# Patient Record
Sex: FEMALE | Race: WHITE | Employment: FULL TIME | URBAN - METROPOLITAN AREA
[De-identification: names, ages, dates, MRNs, and addresses within clinical notes are randomized per-mention and may not be internally consistent; named-entity substitution may affect disease eponyms.]

---

## 2024-11-11 ENCOUNTER — TELEMEDICINE (OUTPATIENT)
Dept: RHEUMATOLOGY | Facility: CLINIC | Age: 24
End: 2024-11-11
Payer: COMMERCIAL

## 2024-11-11 ENCOUNTER — TELEPHONE (OUTPATIENT)
Dept: RHEUMATOLOGY | Facility: CLINIC | Age: 24
End: 2024-11-11

## 2024-11-11 DIAGNOSIS — R76.8 POSITIVE ANA (ANTINUCLEAR ANTIBODY): Primary | ICD-10-CM

## 2024-11-11 DIAGNOSIS — Z11.59 NEED FOR HEPATITIS C SCREENING TEST: ICD-10-CM

## 2024-11-11 DIAGNOSIS — E55.9 VITAMIN D DEFICIENCY: ICD-10-CM

## 2024-11-11 DIAGNOSIS — M76.62 LEFT ACHILLES TENDINITIS: ICD-10-CM

## 2024-11-11 PROBLEM — M25.50 DIFFUSE ARTHRALGIA: Status: ACTIVE | Noted: 2024-11-11

## 2024-11-11 PROBLEM — M25.50 DIFFUSE ARTHRALGIA: Status: RESOLVED | Noted: 2024-11-11 | Resolved: 2024-11-11

## 2024-11-11 PROCEDURE — 99204 OFFICE O/P NEW MOD 45 MIN: CPT | Performed by: INTERNAL MEDICINE

## 2024-11-11 NOTE — ASSESSMENT & PLAN NOTE
Orders:    Anti-DNA antibody, double-stranded; Future    Anti-scleroderma antibody; Future    Centromere Antibody; Future    Histone Antibody; Future    Nuclear antigen antibody; Future    Sjogren's Antibodies; Future    Sedimentation rate, automated; Future    C-reactive protein; Future    C3 complement; Future    C4 complement; Future    Beta-2 glycoprotein antibodies; Future    Cardiolipin antibody; Future    Lupus anticoagulant; Future    Urinalysis with microscopic; Future    Protein / creatinine ratio, urine; Future    RF Screen w/ Reflex to Titer; Future    Cyclic citrul peptide antibody, IgG; Future    CBC and differential; Future    Comprehensive metabolic panel; Future    Ferritin; Future

## 2024-11-11 NOTE — ASSESSMENT & PLAN NOTE
Ms. Salas is a 24-year-old female with history significant for left Achilles tendinosis and a positive ANYA 1:320 speckled pattern who presents for an evaluation of both conditions.  She is referred by Dr. Ignacio for a rheumatology consult.    - Nely presents today for an evaluation of a positive ANYA that was detected in 2021 due to left wrist pain that resolved spontaneously.  The ANYA was evaluated by rheumatology at [facility] and determined to be clinically insignificant.  She presents for a second opinion and re-evaluation as she has been experiencing constant left heel/Achilles insertion pain for the past 2 years which has been diagnosed as Achilles insertional tendinosis.  She has failed multiple conservative options offered by Podiatry.  Her articular and extra-articular review of systems is otherwise unrevealing.  Based on isolated tendon involvement this makes it challenging to associate with an inflammatory condition (including a seronegative spondyloarthropathy) and my overall suspicion for a rheumatic etiology is low.  She may need to follow up with Podiatry to discuss the next steps in management and has been offered PRP, shockwave therapy or surgery.    - To re-evaluate the ANYA I will complete subset serologies, but again based on her review of systems my suspicion for a clinically significant ANYA is low at this time.     Orders:    Anti-DNA antibody, double-stranded; Future    Anti-scleroderma antibody; Future    Centromere Antibody; Future    Histone Antibody; Future    Nuclear antigen antibody; Future    Sjogren's Antibodies; Future    Sedimentation rate, automated; Future    C-reactive protein; Future    C3 complement; Future    C4 complement; Future    Beta-2 glycoprotein antibodies; Future    Cardiolipin antibody; Future    Lupus anticoagulant; Future    Urinalysis with microscopic; Future    Protein / creatinine ratio, urine; Future    RF Screen w/ Reflex to Titer; Future    Cyclic citrul  peptide antibody, IgG; Future    CBC and differential; Future    Comprehensive metabolic panel; Future    Ferritin; Future

## 2024-11-11 NOTE — PROGRESS NOTES
Virtual Regular Visit  Name: Nely Salas      : 2000      MRN: [id number]  Encounter Provider: Lori Calhoun MD  Encounter Date: 2024   Encounter department: [facility] [place]    Verification of patient location:    Patient is located at Other in the following state in which I hold an active license [place]    Assessment & Plan  Positive ANYA (antinuclear antibody)  Ms. Salas is a 24-year-old female with history significant for left Achilles tendinosis and a positive ANYA 1:320 speckled pattern who presents for an evaluation of both conditions.  She is referred by Dr. Ignacio for a rheumatology consult.    - Nely presents today for an evaluation of a positive ANYA that was detected in  due to left wrist pain that resolved spontaneously.  The ANYA was evaluated by rheumatology at [facility] and determined to be clinically insignificant.  She presents for a second opinion and re-evaluation as she has been experiencing constant left heel/Achilles insertion pain for the past 2 years which has been diagnosed as Achilles insertional tendinosis.  She has failed multiple conservative options offered by Podiatry.  Her articular and extra-articular review of systems is otherwise unrevealing.  Based on isolated tendon involvement this makes it challenging to associate with an inflammatory condition (including a seronegative spondyloarthropathy) and my overall suspicion for a rheumatic etiology is low.  She may need to follow up with Podiatry to discuss the next steps in management and has been offered PRP, shockwave therapy or surgery.    - To re-evaluate the ANYA I will complete subset serologies, but again based on her review of systems my suspicion for a clinically significant ANYA is low at this time.     Orders:    Anti-DNA antibody, double-stranded; Future    Anti-scleroderma antibody; Future    Centromere Antibody; Future    Histone Antibody; Future    Nuclear antigen antibody;  Future    Sjogren's Antibodies; Future    Sedimentation rate, automated; Future    C-reactive protein; Future    C3 complement; Future    C4 complement; Future    Beta-2 glycoprotein antibodies; Future    Cardiolipin antibody; Future    Lupus anticoagulant; Future    Urinalysis with microscopic; Future    Protein / creatinine ratio, urine; Future    RF Screen w/ Reflex to Titer; Future    Cyclic citrul peptide antibody, IgG; Future    CBC and differential; Future    Comprehensive metabolic panel; Future    Ferritin; Future    Left Achilles tendinitis    Orders:    Anti-DNA antibody, double-stranded; Future    Anti-scleroderma antibody; Future    Centromere Antibody; Future    Histone Antibody; Future    Nuclear antigen antibody; Future    Sjogren's Antibodies; Future    Sedimentation rate, automated; Future    C-reactive protein; Future    C3 complement; Future    C4 complement; Future    Beta-2 glycoprotein antibodies; Future    Cardiolipin antibody; Future    Lupus anticoagulant; Future    Urinalysis with microscopic; Future    Protein / creatinine ratio, urine; Future    RF Screen w/ Reflex to Titer; Future    Cyclic citrul peptide antibody, IgG; Future    CBC and differential; Future    Comprehensive metabolic panel; Future    Ferritin; Future    Vitamin D deficiency         Need for hepatitis C screening test    Orders:    Chronic Hepatitis Panel; Future          Encounter provider Lori Calhoun MD    The patient was identified by name and date of birth. Nely Salas was informed that this is a telemedicine visit and that the visit is being conducted through the Epic Embedded platform. She agrees to proceed..  My office door was closed. No one else was in the room.  She acknowledged consent and understanding of privacy and security of the video platform. The patient has agreed to participate and understands they can discontinue the visit at any time.    Patient is aware this is a billable service.      History of Present Illness     Ms. Salas is a 24-year-old female with history significant for left Achilles tendinosis and a positive ANYA 1:320 speckled pattern who presents for an evaluation of both conditions.  She is referred by Dr. Ignacio for a rheumatology consult.    Patient in 2021 she was experiencing left wrist pain for which she had arthritis testing done and it showed the positive ANYA with negative subset serologies, rheumatoid factor, uric acid, Lyme antibody profile and HLA B27 antigen.  She was referred to Rheumatology and seen by Dr. Moisés Berry at [facility] in [phone].  She underwent an MRI of the left wrist which was unremarkable except for showing mild focal synovitis in the pisotriquetral recess.  She mentions her symptoms eventually resolved spontaneously and there was no specific diagnosis or treatment offered by her rheumatologist.  For the past 2 years she has been experiencing pain in her left heel at the insertion of the Achilles tendon which is present on a daily basis but can vary in intensity.  When she is active and moving around the symptoms are improved, any time she rests for a prolonged period of time and then gets up the pain becomes very prominent.  She has seen 2 podiatrists for an evaluation and had an MRI of the left ankle done which showed Achilles insertional tendinosis and was otherwise normal.  She has received cortisone injections and tried multiple NSAIDs (possibly including meloxicam) without any relief.  She does have ibuprofen 800 mg tablets which she takes infrequently and this helps to some degree.  Physical therapy provided her with some improvement in the range of motion but did not impact the pain.  Wearing a boot for 2 months and immobilizing the joint did provide her with relief.  As the next steps she was offered PRP and shockwave therapy but she has not pursued this yet as it is not covered by insurance.  She was also offered tendon lengthening  surgery but is still contemplating this.    She reports on occasion she may experience right shoulder pain but otherwise denies joint pains, swelling or morning stiffness.  With prolonged computer use she may notice mild dry eyes.  In the shower she notices hair loss.  No fevers, unintentional weight loss, alopecia, consistent dry eyes, dry mouth, inflammatory eye disease, mouth/nose ulcers, recurrent swollen glands (she mentions last month she noticed painful lumps in the region of her armpits which resolved spontaneously within 2 weeks), pleuritic chest pain, inflammatory bowel disease, blood clots, miscarriages, Raynaud's or a family history of autoimmune disease.  Her maternal grand mother has osteoporosis.         Review of Systems  Constitutional: Negative for weight change, fevers, chills, night sweats, fatigue.  ENT/Mouth: Negative for hearing changes, ear pain, nasal congestion, sinus pain, hoarseness, sore throat, rhinorrhea, swallowing difficulty.   Eyes: Negative for pain, redness, discharge, vision changes.   Cardiovascular: Negative for chest pain, SOB, palpitations.   Respiratory: Negative for cough, sputum, wheezing, dyspnea.   Gastrointestinal: Negative for nausea, vomiting, diarrhea, constipation, pain, heartburn.  Genitourinary: Negative for dysuria, urinary frequency, hematuria.   Musculoskeletal: As per HPI.  Skin: Negative for skin rash, color changes.   Neuro: Negative for weakness, numbness, tingling, loss of consciousness.   Psych: Negative for anxiety, depression.   Heme/Lymph: Negative for easy bruising, bleeding, lymphadenopathy.      Past Medical History   No past medical history on file.  No past surgical history on file.  No family history on file.  No current outpatient medications on file prior to visit.     No current facility-administered medications on file prior to visit.   Not on File   No current outpatient medications on file prior to visit.     No current  facility-administered medications on file prior to visit.      Social History     Tobacco Use    Smoking status: Not on file    Smokeless tobacco: Not on file   Substance and Sexual Activity    Alcohol use: Not on file    Drug use: Not on file    Sexual activity: Not on file         Objective     There were no vitals taken for this visit.  Physical Exam  General: Well appearing, well nourished, in no distress. Oriented x 3, normal mood and affect.  Skin: Good turgor, no rash, unusual bruising or prominent lesions.  Hair: Normal texture and distribution.  HEENT:  Head: Normocephalic, atraumatic.  Eyes: Conjunctiva clear, sclera non-icteric, EOM intact.  Nose: No external lesions.  Neck: Supple.  Neurologic: Alert and oriented. No focal neurological deficits appreciated.   Psychiatric: Normal mood and affect.     Visit Time  Total Visit Duration: 25 minutes.

## 2024-11-11 NOTE — TELEPHONE ENCOUNTER
Please offer her a VV follow up on Dec 2nd, can check what time works for her. If she accepts please ask Nubia to open a slot.

## 2024-11-13 ENCOUNTER — LAB (OUTPATIENT)
Dept: LAB | Facility: CLINIC | Age: 24
End: 2024-11-13
Payer: COMMERCIAL

## 2024-11-13 ENCOUNTER — TELEPHONE (OUTPATIENT)
Dept: RHEUMATOLOGY | Facility: CLINIC | Age: 24
End: 2024-11-13

## 2024-11-13 DIAGNOSIS — M76.62 LEFT ACHILLES TENDINITIS: ICD-10-CM

## 2024-11-13 DIAGNOSIS — Z11.59 NEED FOR HEPATITIS C SCREENING TEST: ICD-10-CM

## 2024-11-13 DIAGNOSIS — R76.8 POSITIVE ANA (ANTINUCLEAR ANTIBODY): ICD-10-CM

## 2024-11-13 LAB
ALBUMIN SERPL BCG-MCNC: 4.3 G/DL (ref 3.5–5)
ALP SERPL-CCNC: 73 U/L (ref 34–104)
ALT SERPL W P-5'-P-CCNC: 19 U/L (ref 7–52)
ANION GAP SERPL CALCULATED.3IONS-SCNC: 8 MMOL/L (ref 4–13)
AST SERPL W P-5'-P-CCNC: 22 U/L (ref 13–39)
BACTERIA UR QL AUTO: ABNORMAL /HPF
BASOPHILS # BLD AUTO: 0.05 THOUSANDS/ÂΜL (ref 0–0.1)
BASOPHILS NFR BLD AUTO: 1 % (ref 0–1)
BILIRUB SERPL-MCNC: 0.58 MG/DL (ref 0.2–1)
BILIRUB UR QL STRIP: NEGATIVE
BUN SERPL-MCNC: 13 MG/DL (ref 5–25)
C3 SERPL-MCNC: 186 MG/DL (ref 87–200)
C4 SERPL-MCNC: 43 MG/DL (ref 19–52)
CALCIUM SERPL-MCNC: 9.1 MG/DL (ref 8.4–10.2)
CHLORIDE SERPL-SCNC: 104 MMOL/L (ref 96–108)
CLARITY UR: ABNORMAL
CO2 SERPL-SCNC: 26 MMOL/L (ref 21–32)
COLOR UR: YELLOW
CREAT SERPL-MCNC: 0.62 MG/DL (ref 0.6–1.3)
CREAT UR-MCNC: 151.3 MG/DL
CRP SERPL QL: 11.4 MG/L
EOSINOPHIL # BLD AUTO: 0.14 THOUSAND/ÂΜL (ref 0–0.61)
EOSINOPHIL NFR BLD AUTO: 2 % (ref 0–6)
ERYTHROCYTE [DISTWIDTH] IN BLOOD BY AUTOMATED COUNT: 13.2 % (ref 11.6–15.1)
ERYTHROCYTE [SEDIMENTATION RATE] IN BLOOD: 23 MM/HOUR (ref 0–19)
FERRITIN SERPL-MCNC: 49 NG/ML (ref 11–307)
GFR SERPL CREATININE-BSD FRML MDRD: 124 ML/MIN/1.73SQ M
GLUCOSE P FAST SERPL-MCNC: 87 MG/DL (ref 65–99)
GLUCOSE UR STRIP-MCNC: NEGATIVE MG/DL
HCT VFR BLD AUTO: 43.6 % (ref 34.8–46.1)
HGB BLD-MCNC: 14.3 G/DL (ref 11.5–15.4)
HGB UR QL STRIP.AUTO: NEGATIVE
IMM GRANULOCYTES # BLD AUTO: 0.03 THOUSAND/UL (ref 0–0.2)
IMM GRANULOCYTES NFR BLD AUTO: 0 % (ref 0–2)
KETONES UR STRIP-MCNC: NEGATIVE MG/DL
LEUKOCYTE ESTERASE UR QL STRIP: NEGATIVE
LYMPHOCYTES # BLD AUTO: 1.34 THOUSANDS/ÂΜL (ref 0.6–4.47)
LYMPHOCYTES NFR BLD AUTO: 18 % (ref 14–44)
MCH RBC QN AUTO: 28.1 PG (ref 26.8–34.3)
MCHC RBC AUTO-ENTMCNC: 32.8 G/DL (ref 31.4–37.4)
MCV RBC AUTO: 86 FL (ref 82–98)
MONOCYTES # BLD AUTO: 0.49 THOUSAND/ÂΜL (ref 0.17–1.22)
MONOCYTES NFR BLD AUTO: 7 % (ref 4–12)
MUCOUS THREADS UR QL AUTO: ABNORMAL
NEUTROPHILS # BLD AUTO: 5.31 THOUSANDS/ÂΜL (ref 1.85–7.62)
NEUTS SEG NFR BLD AUTO: 72 % (ref 43–75)
NITRITE UR QL STRIP: NEGATIVE
NON-SQ EPI CELLS URNS QL MICRO: ABNORMAL /HPF
NRBC BLD AUTO-RTO: 0 /100 WBCS
PH UR STRIP.AUTO: 6.5 [PH]
PLATELET # BLD AUTO: 321 THOUSANDS/UL (ref 149–390)
PMV BLD AUTO: 9.7 FL (ref 8.9–12.7)
POTASSIUM SERPL-SCNC: 4.1 MMOL/L (ref 3.5–5.3)
PROT SERPL-MCNC: 7.2 G/DL (ref 6.4–8.4)
PROT UR STRIP-MCNC: ABNORMAL MG/DL
PROT UR-MCNC: 9.8 MG/DL
PROT/CREAT UR: 0.1 MG/G{CREAT} (ref 0–0.1)
RBC # BLD AUTO: 5.09 MILLION/UL (ref 3.81–5.12)
RBC #/AREA URNS AUTO: ABNORMAL /HPF
SODIUM SERPL-SCNC: 138 MMOL/L (ref 135–147)
SP GR UR STRIP.AUTO: >=1.03 (ref 1–1.03)
UROBILINOGEN UR STRIP-ACNC: <2 MG/DL
WBC # BLD AUTO: 7.36 THOUSAND/UL (ref 4.31–10.16)
WBC #/AREA URNS AUTO: ABNORMAL /HPF

## 2024-11-13 PROCEDURE — 84156 ASSAY OF PROTEIN URINE: CPT

## 2024-11-13 PROCEDURE — 86705 HEP B CORE ANTIBODY IGM: CPT

## 2024-11-13 PROCEDURE — 85652 RBC SED RATE AUTOMATED: CPT

## 2024-11-13 PROCEDURE — 86200 CCP ANTIBODY: CPT

## 2024-11-13 PROCEDURE — 82570 ASSAY OF URINE CREATININE: CPT

## 2024-11-13 PROCEDURE — 85613 RUSSELL VIPER VENOM DILUTED: CPT

## 2024-11-13 PROCEDURE — 82728 ASSAY OF FERRITIN: CPT

## 2024-11-13 PROCEDURE — 85670 THROMBIN TIME PLASMA: CPT

## 2024-11-13 PROCEDURE — 80053 COMPREHEN METABOLIC PANEL: CPT

## 2024-11-13 PROCEDURE — 86225 DNA ANTIBODY NATIVE: CPT

## 2024-11-13 PROCEDURE — 86146 BETA-2 GLYCOPROTEIN ANTIBODY: CPT

## 2024-11-13 PROCEDURE — 36415 COLL VENOUS BLD VENIPUNCTURE: CPT

## 2024-11-13 PROCEDURE — 86147 CARDIOLIPIN ANTIBODY EA IG: CPT

## 2024-11-13 PROCEDURE — 86235 NUCLEAR ANTIGEN ANTIBODY: CPT

## 2024-11-13 PROCEDURE — 85732 THROMBOPLASTIN TIME PARTIAL: CPT

## 2024-11-13 PROCEDURE — 86430 RHEUMATOID FACTOR TEST QUAL: CPT

## 2024-11-13 PROCEDURE — 86704 HEP B CORE ANTIBODY TOTAL: CPT

## 2024-11-13 PROCEDURE — 85705 THROMBOPLASTIN INHIBITION: CPT

## 2024-11-13 PROCEDURE — 81001 URINALYSIS AUTO W/SCOPE: CPT

## 2024-11-13 PROCEDURE — 86160 COMPLEMENT ANTIGEN: CPT

## 2024-11-13 PROCEDURE — 85025 COMPLETE CBC W/AUTO DIFF WBC: CPT

## 2024-11-13 PROCEDURE — 86803 HEPATITIS C AB TEST: CPT

## 2024-11-13 PROCEDURE — 87340 HEPATITIS B SURFACE AG IA: CPT

## 2024-11-13 PROCEDURE — 86140 C-REACTIVE PROTEIN: CPT

## 2024-11-13 NOTE — TELEPHONE ENCOUNTER
The patient was called, she stated that she has to work on Dec. 2nd so an appointment that day will not work for her.

## 2024-11-14 LAB
HBV CORE AB SER QL: NORMAL
HBV CORE IGM SER QL: NORMAL
HBV SURFACE AG SER QL: NORMAL
HCV AB SER QL: NORMAL
RHEUMATOID FACT SER QL LA: NEGATIVE

## 2024-11-14 NOTE — TELEPHONE ENCOUNTER
She could do a virtual either December 4th after 11 Am and the 5th before noon. What day and time would you like her scheduled for?

## 2024-11-15 ENCOUNTER — TELEPHONE (OUTPATIENT)
Dept: RHEUMATOLOGY | Facility: CLINIC | Age: 24
End: 2024-11-15

## 2024-11-15 LAB
APTT SCREEN TO CONFIRM RATIO: 1.02 RATIO (ref 0–1.34)
B2 GLYCOPROT1 IGA SERPL IA-ACNC: 1.4
B2 GLYCOPROT1 IGG SERPL IA-ACNC: 1.1
B2 GLYCOPROT1 IGM SERPL IA-ACNC: 3
CARDIOLIPIN IGA SER IA-ACNC: 3.2
CARDIOLIPIN IGG SER IA-ACNC: 2.7
CARDIOLIPIN IGM SER IA-ACNC: 1.8
CCP AB SER IA-ACNC: 0.6
CENTROMERE B AB SER-ACNC: <0.2 AI (ref 0–0.9)
CONFIRM APTT/NORMAL: 35.1 SEC (ref 0–47.6)
DSDNA AB SER-ACNC: <1 IU/ML (ref 0–9)
ENA RNP AB SER-ACNC: 0.3 AI (ref 0–0.9)
ENA SCL70 AB SER-ACNC: <0.2 AI (ref 0–0.9)
ENA SM AB SER-ACNC: <0.2 AI (ref 0–0.9)
ENA SS-A AB SER-ACNC: <0.2 AI (ref 0–0.9)
ENA SS-B AB SER-ACNC: <0.2 AI (ref 0–0.9)
LA PPP-IMP: NORMAL
SCREEN APTT: 36.9 SEC (ref 0–43.5)
SCREEN DRVVT: 35.2 SEC (ref 0–47)
THROMBIN TIME: 16.8 SEC (ref 0–23)

## 2024-11-15 NOTE — TELEPHONE ENCOUNTER
The patient was called , she would be able to do the 17 th, but before 11 AM. She will be on her way to work at 11:30.

## 2024-11-19 ENCOUNTER — RESULTS FOLLOW-UP (OUTPATIENT)
Dept: RHEUMATOLOGY | Facility: CLINIC | Age: 24
End: 2024-11-19

## 2024-11-19 LAB — HISTONE IGG SER IA-ACNC: 0.5 UNITS (ref 0–0.9)

## 2024-12-16 PROBLEM — M76.62 LEFT ACHILLES TENDINITIS: Status: ACTIVE | Noted: 2024-12-16

## 2024-12-17 ENCOUNTER — TELEMEDICINE (OUTPATIENT)
Dept: RHEUMATOLOGY | Facility: CLINIC | Age: 24
End: 2024-12-17
Payer: COMMERCIAL

## 2024-12-17 DIAGNOSIS — M76.62 LEFT ACHILLES TENDINITIS: ICD-10-CM

## 2024-12-17 DIAGNOSIS — R76.8 POSITIVE ANA (ANTINUCLEAR ANTIBODY): Primary | ICD-10-CM

## 2024-12-17 PROCEDURE — 99213 OFFICE O/P EST LOW 20 MIN: CPT | Performed by: INTERNAL MEDICINE

## 2024-12-17 NOTE — PROGRESS NOTES
Virtual Regular Visit  Name: Nely Salas      : 2000      MRN: [id number]  Encounter Provider: Lori Calhoun MD  Encounter Date: 2024   Encounter department: [facility] [place]    Verification of patient location:    Patient is located at Other in the following state in which I hold an active license [place]    Assessment & Plan  Positive ANYA (antinuclear antibody)  Ms. Salas is a 24-year-old female with history significant for left Achilles tendinosis and a positive ANYA 1:320 speckled pattern who presents for a follow-up.     - Nely presents today for a follow-up of a positive ANYA that was detected in  due to left wrist pain that resolved spontaneously.  The ANYA was evaluated by rheumatology at [facility] and determined to be clinically insignificant.  She presents for a second opinion and re-evaluation as she has been experiencing constant left heel/Achilles insertion pain for the past 2 years which has been diagnosed as Achilles insertional tendinosis.  She has failed multiple conservative options offered by Podiatry.  Her articular and extra-articular review of systems is otherwise unrevealing.  Based on isolated tendon involvement this makes it challenging to associate with an inflammatory condition (including a seronegative spondyloarthropathy) and my overall suspicion for a rheumatic etiology is low.  She may need to follow up with Podiatry to discuss the next steps in management and has been offered PRP, shockwave therapy or surgery.     - At this time I do not suspect the ANYA to be clinically significant.  It will be reevaluated as needed.    Orders:    CBC and differential; Future    Comprehensive metabolic panel; Future    C-reactive protein; Future    Sedimentation rate, automated; Future    C4 complement; Future    C3 complement; Future    Urinalysis with microscopic; Future    Protein / creatinine ratio, urine; Future    Anti-DNA antibody,  double-stranded; Future    Left Achilles tendinitis               Encounter provider Lori Calhoun MD    The patient was identified by name and date of birth. Nely Salas was informed that this is a telemedicine visit and that the visit is being conducted through the Epic Embedded platform. She agrees to proceed..  My office door was closed. No one else was in the room.  She acknowledged consent and understanding of privacy and security of the video platform. The patient has agreed to participate and understands they can discontinue the visit at any time.    Patient is aware this is a billable service.     History of Present Illness       INITIAL VISIT NOTE (11/2024):  Ms. Salas is a 24-year-old female with history significant for left Achilles tendinosis and a positive ANYA 1:320 speckled pattern who presents for an evaluation of both conditions.  She is referred by Dr. Ignacio for a rheumatology consult.    Patient in 2021 she was experiencing left wrist pain for which she had arthritis testing done and it showed the positive ANYA with negative subset serologies, rheumatoid factor, uric acid, Lyme antibody profile and HLA B27 antigen.  She was referred to Rheumatology and seen by Dr. Moisés Berry at [facility] in [phone].  She underwent an MRI of the left wrist which was unremarkable except for showing mild focal synovitis in the pisotriquetral recess.  She mentions her symptoms eventually resolved spontaneously and there was no specific diagnosis or treatment offered by her rheumatologist.  For the past 2 years she has been experiencing pain in her left heel at the insertion of the Achilles tendon which is present on a daily basis but can vary in intensity.  When she is active and moving around the symptoms are improved, any time she rests for a prolonged period of time and then gets up the pain becomes very prominent.  She has seen 2 podiatrists for an evaluation and had an MRI of the left ankle done  which showed Achilles insertional tendinosis and was otherwise normal.  She has received cortisone injections and tried multiple NSAIDs (possibly including meloxicam) without any relief.  She does have ibuprofen 800 mg tablets which she takes infrequently and this helps to some degree.  Physical therapy provided her with some improvement in the range of motion but did not impact the pain.  Wearing a boot for 2 months and immobilizing the joint did provide her with relief.  As the next steps she was offered PRP and shockwave therapy but she has not pursued this yet as it is not covered by insurance.  She was also offered tendon lengthening surgery but is still contemplating this.    She reports on occasion she may experience right shoulder pain but otherwise denies joint pains, swelling or morning stiffness.  With prolonged computer use she may notice mild dry eyes.  In the shower she notices hair loss.  No fevers, unintentional weight loss, alopecia, consistent dry eyes, dry mouth, inflammatory eye disease, mouth/nose ulcers, recurrent swollen glands (she mentions last month she noticed painful lumps in the region of her armpits which resolved spontaneously within 2 weeks), pleuritic chest pain, inflammatory bowel disease, blood clots, miscarriages, Raynaud's or a family history of autoimmune disease.  Her maternal grand mother has osteoporosis.       12/17/2024:  Patient presents for a follow-up appointment to review her results.  The ESR and CRP were minimally elevated at 23 and 11.4, respectively.  An ANYA specificity, C3, C4, antiphospholipid antibody panel, urine analysis, urine protein creatinine ratio, rheumatoid factor, anti-CCP antibody, CBC, CMP, chronic hepatitis panel and ferritin were unremarkable.    No new complaints from the last visit.  She still has to follow-up with podiatry to discuss the next steps in treatment for the Achilles tendinosis.      Review of Systems  Constitutional: Negative for  weight change, fevers, chills, night sweats, fatigue.  ENT/Mouth: Negative for hearing changes, ear pain, nasal congestion, sinus pain, hoarseness, sore throat, rhinorrhea, swallowing difficulty.   Eyes: Negative for pain, redness, discharge, vision changes.   Cardiovascular: Negative for chest pain, SOB, palpitations.   Respiratory: Negative for cough, sputum, wheezing, dyspnea.   Gastrointestinal: Negative for nausea, vomiting, diarrhea, constipation, pain, heartburn.  Genitourinary: Negative for dysuria, urinary frequency, hematuria.   Musculoskeletal: As per HPI.  Skin: Negative for skin rash, color changes.   Neuro: Negative for weakness, numbness, tingling, loss of consciousness.   Psych: Negative for anxiety, depression.   Heme/Lymph: Negative for easy bruising, bleeding, lymphadenopathy.      Past Medical History   No past medical history on file.  No past surgical history on file.  No family history on file.  No current outpatient medications on file prior to visit.     No current facility-administered medications on file prior to visit.   Not on File   No current outpatient medications on file prior to visit.     No current facility-administered medications on file prior to visit.      Social History     Tobacco Use    Smoking status: Not on file    Smokeless tobacco: Not on file   Substance and Sexual Activity    Alcohol use: Not on file    Drug use: Not on file    Sexual activity: Not on file         Objective     There were no vitals taken for this visit.  Physical Exam  General: Well appearing, well nourished, in no distress. Oriented x 3, normal mood and affect.  Skin: Good turgor, no rash, unusual bruising or prominent lesions.  Hair: Normal texture and distribution.  HEENT:  Head: Normocephalic, atraumatic.  Eyes: Conjunctiva clear, sclera non-icteric, EOM intact.  Nose: No external lesions.  Neck: Supple.  Neurologic: Alert and oriented. No focal neurological deficits appreciated.   Psychiatric:  Normal mood and affect.       Visit Time  Total Visit Duration: 12 minutes.

## 2024-12-17 NOTE — ASSESSMENT & PLAN NOTE
Ms. Salas is a 24-year-old female with history significant for left Achilles tendinosis and a positive ANYA 1:320 speckled pattern who presents for a follow-up.     - Nely presents today for a follow-up of a positive ANYA that was detected in 2021 due to left wrist pain that resolved spontaneously.  The ANYA was evaluated by rheumatology at [facility] and determined to be clinically insignificant.  She presents for a second opinion and re-evaluation as she has been experiencing constant left heel/Achilles insertion pain for the past 2 years which has been diagnosed as Achilles insertional tendinosis.  She has failed multiple conservative options offered by Podiatry.  Her articular and extra-articular review of systems is otherwise unrevealing.  Based on isolated tendon involvement this makes it challenging to associate with an inflammatory condition (including a seronegative spondyloarthropathy) and my overall suspicion for a rheumatic etiology is low.  She may need to follow up with Podiatry to discuss the next steps in management and has been offered PRP, shockwave therapy or surgery.     - At this time I do not suspect the ANYA to be clinically significant.  It will be reevaluated as needed.    Orders:    CBC and differential; Future    Comprehensive metabolic panel; Future    C-reactive protein; Future    Sedimentation rate, automated; Future    C4 complement; Future    C3 complement; Future    Urinalysis with microscopic; Future    Protein / creatinine ratio, urine; Future    Anti-DNA antibody, double-stranded; Future

## 2025-05-08 ENCOUNTER — OFFICE VISIT (OUTPATIENT)
Age: 25
End: 2025-05-08

## 2025-05-08 VITALS — HEIGHT: 63 IN | WEIGHT: 160 LBS | BODY MASS INDEX: 28.35 KG/M2

## 2025-05-08 DIAGNOSIS — M72.2 PLANTAR FASCIITIS, LEFT: Primary | ICD-10-CM

## 2025-05-08 RX ORDER — DEXAMETHASONE SODIUM PHOSPHATE 4 MG/ML
4 INJECTION, SOLUTION INTRA-ARTICULAR; INTRALESIONAL; INTRAMUSCULAR; INTRAVENOUS; SOFT TISSUE ONCE
Status: COMPLETED | OUTPATIENT
Start: 2025-05-08 | End: 2025-05-08

## 2025-05-08 RX ORDER — TRIAMCINOLONE ACETONIDE 40 MG/ML
40 INJECTION, SUSPENSION INTRA-ARTICULAR; INTRAMUSCULAR ONCE
Status: COMPLETED | OUTPATIENT
Start: 2025-05-08 | End: 2025-05-08

## 2025-05-08 RX ORDER — LIDOCAINE HYDROCHLORIDE 10 MG/ML
1 INJECTION, SOLUTION EPIDURAL; INFILTRATION; INTRACAUDAL; PERINEURAL ONCE
Status: COMPLETED | OUTPATIENT
Start: 2025-05-08 | End: 2025-05-08

## 2025-05-08 RX ORDER — MELOXICAM 15 MG/1
15 TABLET ORAL DAILY
Qty: 30 TABLET | Refills: 0 | Status: SHIPPED | OUTPATIENT
Start: 2025-05-08

## 2025-05-08 RX ADMIN — DEXAMETHASONE SODIUM PHOSPHATE 4 MG: 4 INJECTION, SOLUTION INTRA-ARTICULAR; INTRALESIONAL; INTRAMUSCULAR; INTRAVENOUS; SOFT TISSUE at 20:58

## 2025-05-08 RX ADMIN — TRIAMCINOLONE ACETONIDE 40 MG: 40 INJECTION, SUSPENSION INTRA-ARTICULAR; INTRAMUSCULAR at 21:00

## 2025-05-08 RX ADMIN — LIDOCAINE HYDROCHLORIDE 1 ML: 10 INJECTION, SOLUTION EPIDURAL; INFILTRATION; INTRACAUDAL; PERINEURAL at 20:59

## 2025-05-08 NOTE — PROGRESS NOTES
[facility] Podiatric Medicine and Surgery Office Visit    ASSESSMENT     Diagnoses and all orders for this visit:    Plantar fasciitis, left  -     meloxicam (Mobic) 15 mg tablet; Take 1 tablet (15 mg total) by mouth daily  -     Foot/lower extremity injection  -     lidocaine (PF) (XYLOCAINE-MPF) 1 % injection 1 mL  -     dexamethasone (DECADRON) injection 4 mg  -     triamcinolone acetonide (KENALOG-40) 40 mg/mL injection 40 mg    Other orders  -     etonogestrel (NEXPLANON) subdermal implant; Inject under the skin         Problem List Items Addressed This Visit    None  Visit Diagnoses         Plantar fasciitis, left    -  Primary    Relevant Medications    meloxicam (Mobic) 15 mg tablet    lidocaine (PF) (XYLOCAINE-MPF) 1 % injection 1 mL (Completed)    dexamethasone (DECADRON) injection 4 mg (Completed)    triamcinolone acetonide (KENALOG-40) 40 mg/mL injection 40 mg (Completed)    Other Relevant Orders    Foot/lower extremity injection          PLAN  -Patient was educated regarding their condition.  -Educated patient on plantar fasciitis stretching program to be performed daily  -Recommend purchase of supportive shoes with wide toe box. Recommend purchase of OTC orthosis (superfeet, powersteps, or tread labs).  -Rx meloxicam  -Corticosteroid injection performed today  -Patient will RTC in 6-weeks    Foot/lower extremity injection    Performed by: Ernesto Dacosta DPM  Authorized by: Ernesto Dacosta DPM    Procedure:     Other Assisting Provider: No      Verbal consent obtained?: Yes      Risks and benefits: Risks, benefits and alternatives were discussed      Consent given by:  Patient    Time out: Immediately prior to the procedure a time out was called      Patient states understanding of procedure being performed: Yes      Site marked: Yes      Patient identity confirmed:  Verbally with patient    Supporting Documentation:     Indications:  Pain    Procedure Details:                Ethyl Chloride was applied       Needle size: 25 G G    Ultrasound Guidance: no      Approach:  Plantar    Laterality:  Left    Cyst Aspiration/Injection: No      Location: aponeurosis      Aponeurosis Structures: Plantar fascia origin      Injection Information:       Patient tolerance:  Patient tolerated the procedure well with no immediate complications    Comments:      Patient's left foot was exposed.  The area of greatest pain was palpated and marked to the left foot.  The foot was cleansed with an alcohol swab and Betadine prep stick.  Next utilizing a 3 cc syringe the foot was injected with 1 mL 1% lidocaine plain, 1 mL dexamethasone 4 mg/mL, 1 mL Kenalog 40.  After the needle was removed the foot was dried and a Band-Aid was placed over the injection site.  The patient tolerated the procedure with no immediate complications.        SUBJECTIVE    Chief Complaint:  Left heel pain'     Patient ID: Nely Mckay is a pleasant 25 year old female who presents today with left heel pain. She states that it has been bothering her for quite some time. The last week it has been extremely painful. Denies any injury. It hurts her the most after she is on her feet for long periods at a time, sits down, and then gets back up. It also becomes painful in the mornings after getting out of bed.  She states that she has seen multiple podiatrist for this issue and has had multiple injections to the area.  She also states that she has underwent multiple courses of physical therapy with no long-lasting relief.      The following portions of the patient's history were reviewed and updated as appropriate: allergies, current medications, past family history, past medical history, past social history, past surgical history and problem list.    Review of Systems   Constitutional: Negative.    HENT: Negative.     Respiratory: Negative.     Cardiovascular: Negative.    Gastrointestinal: Negative.    Musculoskeletal:  Positive for myalgias.   Skin:  "Negative.    Neurological: Negative.          OBJECTIVE      Ht 5' 3\" (1.6 m)   Wt 72.6 kg (160 lb)   BMI 28.34 kg/m²        Physical Exam  Constitutional:       Appearance: Normal appearance.   HENT:      Head: Normocephalic and atraumatic.   Eyes:      General:         Right eye: No discharge.         Left eye: No discharge.   Cardiovascular:      Rate and Rhythm: Normal rate and regular rhythm.      Pulses:           Dorsalis pedis pulses are 2+ on the right side and 2+ on the left side.        Posterior tibial pulses are 2+ on the right side and 2+ on the left side.   Pulmonary:      Effort: Pulmonary effort is normal.      Breath sounds: Normal breath sounds.   Skin:     General: Skin is warm.      Capillary Refill: Capillary refill takes less than 2 seconds.   Neurological:      Sensory: Sensation is intact. No sensory deficit.           MSK:  -Pain on palpation of medial calcaneal tubercle at the insertion site of the plantar fascia of the left foot  -no pain with compression of both sides of heel  -No gross deformities noted  -Active range of motion lesser digits intact  -MMT 5/5 to all muscle compartments of the lower extremity  -Ankle dorsiflexion is less than 10 degrees with knee extended, and knee flexed    Derm:  -No lesions, abrasions, or open wounds noted  -No noted interdigital maceration, peeling, malodor  -No areas of callus formation noted on exam  -no erythema, ecchymosis, edema noted     Vascular:  -DP and PT pulses intact b/l  -Capillary refill time <2 sec b/l  -Skin temp: WNL    Neuro:  -Light sensation intact bilaterally  -Protective sensation intact bilaterally  -Tinel sign negative to the tibial nerve the left foot        "

## 2025-05-08 NOTE — PATIENT INSTRUCTIONS
Purchase a supportive pair of sneakers such as Tian, Hoka, Saucony, New Balance, On Cloud, Altra.  Some qualities to look for is that the shoe should bend only where the toes bend, the sole should not be too flimsy, it should have a wide toe box and a somewhat stiff heel support. Purchase a supportive over-the-counter pair of inserts such as Superfeet, powersteps, tread labs.    [employer]  [address] [facility] [address]  [phone]     [employer]  [address], [place] [address]  [phone]     [employer]  [address] [place] [address] [phone]     [employer]   [address]  [phone]     [employer]  [address], [place], [place] [id number] [phone]     [employer]  [address] [place], [place] [id number] [phone]     [facility] [facility]   [address] [place] [address]  [phone]     [employer]  [address] [place], [place]  [phone]     [employer] [address], [address]  [phone]     [employer]  [address] [place] [address], [address], [place] [address] [phone]

## 2025-06-18 ENCOUNTER — OFFICE VISIT (OUTPATIENT)
Age: 25
End: 2025-06-18
Payer: COMMERCIAL

## 2025-06-18 VITALS — HEIGHT: 63 IN | BODY MASS INDEX: 28.35 KG/M2 | WEIGHT: 160 LBS

## 2025-06-18 DIAGNOSIS — M72.2 PLANTAR FASCIITIS, LEFT: Primary | ICD-10-CM

## 2025-06-18 PROCEDURE — 99213 OFFICE O/P EST LOW 20 MIN: CPT | Performed by: STUDENT IN AN ORGANIZED HEALTH CARE EDUCATION/TRAINING PROGRAM

## 2025-06-18 PROCEDURE — 20550 NJX 1 TENDON SHEATH/LIGAMENT: CPT | Performed by: STUDENT IN AN ORGANIZED HEALTH CARE EDUCATION/TRAINING PROGRAM

## 2025-06-18 NOTE — PROGRESS NOTES
[facility] Podiatric Medicine and Surgery Office Visit    ASSESSMENT     Diagnoses and all orders for this visit:    Plantar fasciitis, left  -     Ambulatory referral to Physical Therapy; Future  -     Foot/lower extremity injection  -     lidocaine (PF) (XYLOCAINE-MPF) 1 % injection 1 mL  -     dexamethasone (DECADRON) injection 4 mg  -     triamcinolone acetonide (KENALOG-40) 40 mg/mL injection 40 mg           Problem List Items Addressed This Visit    None  Visit Diagnoses         Plantar fasciitis, left    -  Primary    Relevant Medications    lidocaine (PF) (XYLOCAINE-MPF) 1 % injection 1 mL (Completed)    dexamethasone (DECADRON) injection 4 mg (Completed)    triamcinolone acetonide (KENALOG-40) 40 mg/mL injection 40 mg (Completed)    Other Relevant Orders    Ambulatory referral to Physical Therapy    Foot/lower extremity injection            PLAN  -Patient was educated regarding their condition.  -Educated patient on plantar fasciitis stretching program to be performed daily  -Recommend purchase of supportive shoes with wide toe box. Recommend purchase of OTC orthosis (superfeet, powersteps, or tread labs).  -Rx meloxicam  -Corticosteroid injection performed today  -Rx physical therapy  -Patient will RTC in 6-weeks    Foot/lower extremity injection    Performed by: Ernesto Dacosta DPM  Authorized by: Ernesto Dacosta DPM    Procedure:     Other Assisting Provider: No      Verbal consent obtained?: Yes      Risks and benefits: Risks, benefits and alternatives were discussed      Consent given by:  Patient    Time out: Immediately prior to the procedure a time out was called      Patient states understanding of procedure being performed: Yes      Site marked: Yes      Patient identity confirmed:  Verbally with patient    Supporting Documentation:     Indications:  Pain    Procedure Details:                Ethyl Chloride was applied      Needle size: 25 G G    Ultrasound Guidance: no      Approach:  Plantar     Laterality:  Left    Cyst Aspiration/Injection: No      Location: aponeurosis      Aponeurosis Structures: Plantar fascia origin      Injection Information:       Patient tolerance:  Patient tolerated the procedure well with no immediate complications    Comments:      Patient's left foot was exposed.  The area of greatest pain was palpated and marked to the left foot.  The foot was cleansed with an alcohol swab and Betadine prep stick.  Next utilizing a 3 cc syringe the foot was injected with 1 mL 1% lidocaine plain, 1 mL dexamethasone 4 mg/mL, 1 mL Kenalog 40.  After the needle was removed the foot was dried and a Band-Aid was placed over the injection site.  The patient tolerated the procedure with no immediate complications.        SUBJECTIVE    Chief Complaint:  Left heel pain'     Patient ID: Nely Mckay is a pleasant 25 year old female who presents today with left heel pain. She states that it has been bothering her for quite some time. The last week it has been extremely painful. Denies any injury. It hurts her the most after she is on her feet for long periods at a time, sits down, and then gets back up. It also becomes painful in the mornings after getting out of bed.  She states that she has seen multiple podiatrist for this issue and has had multiple injections to the area.  She also states that she has underwent multiple courses of physical therapy with no long-lasting relief.    6/18/2025: Nely is overall doing well today. She states that her left heel has been feeling slightly better. She has had about 60% relief from the cortisone injection she received her last visit. She states that she is still having some pain when on her feet for long periods at a time.       The following portions of the patient's history were reviewed and updated as appropriate: allergies, current medications, past family history, past medical history, past social history, past surgical history and problem  "list.    Review of Systems   Constitutional: Negative.    HENT: Negative.     Respiratory: Negative.     Cardiovascular: Negative.    Gastrointestinal: Negative.    Musculoskeletal:  Positive for myalgias.   Skin: Negative.    Neurological: Negative.          OBJECTIVE      Ht 5' 3\" (1.6 m)   Wt 72.6 kg (160 lb)   BMI 28.34 kg/m²        Physical Exam  Constitutional:       Appearance: Normal appearance.   HENT:      Head: Normocephalic and atraumatic.     Eyes:      General:         Right eye: No discharge.         Left eye: No discharge.       Cardiovascular:      Rate and Rhythm: Normal rate and regular rhythm.      Pulses:           Dorsalis pedis pulses are 2+ on the right side and 2+ on the left side.        Posterior tibial pulses are 2+ on the right side and 2+ on the left side.   Pulmonary:      Effort: Pulmonary effort is normal.      Breath sounds: Normal breath sounds.     Skin:     General: Skin is warm.      Capillary Refill: Capillary refill takes less than 2 seconds.     Neurological:      Sensory: Sensation is intact. No sensory deficit.           MSK:  -Pain on palpation of medial calcaneal tubercle at the insertion site of the plantar fascia of the left foot  -no pain with compression of both sides of heel  -No gross deformities noted  -Active range of motion lesser digits intact  -MMT 5/5 to all muscle compartments of the lower extremity  -Ankle dorsiflexion is less than 10 degrees with knee extended, and knee flexed    Derm:  -No lesions, abrasions, or open wounds noted  -No noted interdigital maceration, peeling, malodor  -No areas of callus formation noted on exam  -no erythema, ecchymosis, edema noted     Vascular:  -DP and PT pulses intact b/l  -Capillary refill time <2 sec b/l  -Skin temp: WNL    Neuro:  -Light sensation intact bilaterally  -Protective sensation intact bilaterally  -Tinel sign negative to the tibial nerve the left foot        "

## 2025-06-19 RX ORDER — LIDOCAINE HYDROCHLORIDE 10 MG/ML
1 INJECTION, SOLUTION EPIDURAL; INFILTRATION; INTRACAUDAL; PERINEURAL ONCE
Status: COMPLETED | OUTPATIENT
Start: 2025-06-19 | End: 2025-06-19

## 2025-06-19 RX ORDER — DEXAMETHASONE SODIUM PHOSPHATE 4 MG/ML
4 INJECTION, SOLUTION INTRA-ARTICULAR; INTRALESIONAL; INTRAMUSCULAR; INTRAVENOUS; SOFT TISSUE ONCE
Status: COMPLETED | OUTPATIENT
Start: 2025-06-19 | End: 2025-06-19

## 2025-06-19 RX ORDER — TRIAMCINOLONE ACETONIDE 40 MG/ML
40 INJECTION, SUSPENSION INTRA-ARTICULAR; INTRAMUSCULAR ONCE
Status: COMPLETED | OUTPATIENT
Start: 2025-06-19 | End: 2025-06-19

## 2025-06-19 RX ADMIN — DEXAMETHASONE SODIUM PHOSPHATE 4 MG: 4 INJECTION, SOLUTION INTRA-ARTICULAR; INTRALESIONAL; INTRAMUSCULAR; INTRAVENOUS; SOFT TISSUE at 15:12

## 2025-06-19 RX ADMIN — TRIAMCINOLONE ACETONIDE 40 MG: 40 INJECTION, SUSPENSION INTRA-ARTICULAR; INTRAMUSCULAR at 15:14

## 2025-06-19 RX ADMIN — LIDOCAINE HYDROCHLORIDE 1 ML: 10 INJECTION, SOLUTION EPIDURAL; INFILTRATION; INTRACAUDAL; PERINEURAL at 15:13

## 2025-08-08 ENCOUNTER — OFFICE VISIT (OUTPATIENT)
Age: 25
End: 2025-08-08
Payer: COMMERCIAL